# Patient Record
Sex: FEMALE | NOT HISPANIC OR LATINO | Employment: OTHER | URBAN - METROPOLITAN AREA
[De-identification: names, ages, dates, MRNs, and addresses within clinical notes are randomized per-mention and may not be internally consistent; named-entity substitution may affect disease eponyms.]

---

## 2017-08-21 ENCOUNTER — FOLLOW UP (OUTPATIENT)
Dept: URBAN - METROPOLITAN AREA CLINIC 33 | Facility: CLINIC | Age: 82
End: 2017-08-21

## 2017-08-21 VITALS — HEIGHT: 57 IN | WEIGHT: 105 LBS | BODY MASS INDEX: 22.65 KG/M2

## 2017-08-21 DIAGNOSIS — H43.391: ICD-10-CM

## 2017-08-21 DIAGNOSIS — H35.371: ICD-10-CM

## 2017-08-21 DIAGNOSIS — H43.812: ICD-10-CM

## 2017-08-21 DIAGNOSIS — H35.363: ICD-10-CM

## 2017-08-21 DIAGNOSIS — D31.31: ICD-10-CM

## 2017-08-21 DIAGNOSIS — H26.493: ICD-10-CM

## 2017-08-21 PROCEDURE — 92014 COMPRE OPH EXAM EST PT 1/>: CPT

## 2017-08-21 PROCEDURE — 92250 FUNDUS PHOTOGRAPHY W/I&R: CPT

## 2017-08-21 PROCEDURE — 76512 OPH US DX B-SCAN: CPT

## 2017-08-21 PROCEDURE — 92226 OPHTHALMOSCOPY (SUB): CPT

## 2017-08-21 ASSESSMENT — VISUAL ACUITY
OS_CC: 20/30-2
OD_CC: 20/30+2

## 2017-08-21 ASSESSMENT — TONOMETRY
OS_IOP_MMHG: 13
OD_IOP_MMHG: 14

## 2018-08-21 ENCOUNTER — FOLLOW UP (OUTPATIENT)
Dept: URBAN - METROPOLITAN AREA CLINIC 33 | Facility: CLINIC | Age: 83
End: 2018-08-21

## 2018-08-21 VITALS — HEIGHT: 60 IN

## 2018-08-21 DIAGNOSIS — H35.363: ICD-10-CM

## 2018-08-21 DIAGNOSIS — D31.31: ICD-10-CM

## 2018-08-21 DIAGNOSIS — H35.371: ICD-10-CM

## 2018-08-21 DIAGNOSIS — H43.812: ICD-10-CM

## 2018-08-21 DIAGNOSIS — H43.391: ICD-10-CM

## 2018-08-21 PROCEDURE — 92226 OPHTHALMOSCOPY (SUB): CPT

## 2018-08-21 PROCEDURE — 92014 COMPRE OPH EXAM EST PT 1/>: CPT

## 2018-08-21 PROCEDURE — 92250 FUNDUS PHOTOGRAPHY W/I&R: CPT

## 2018-08-21 PROCEDURE — 76512 OPH US DX B-SCAN: CPT

## 2018-08-21 ASSESSMENT — TONOMETRY
OD_IOP_MMHG: 12
OS_IOP_MMHG: 12

## 2018-08-21 ASSESSMENT — VISUAL ACUITY
OS_CC: 20/30
OD_CC: 20/30

## 2019-09-18 ENCOUNTER — FOLLOW UP (OUTPATIENT)
Dept: URBAN - METROPOLITAN AREA CLINIC 33 | Facility: CLINIC | Age: 84
End: 2019-09-18

## 2019-09-18 VITALS — HEIGHT: 66 IN | BODY MASS INDEX: 16.55 KG/M2 | WEIGHT: 103 LBS

## 2019-09-18 DIAGNOSIS — H35.371: ICD-10-CM

## 2019-09-18 DIAGNOSIS — H35.363: ICD-10-CM

## 2019-09-18 DIAGNOSIS — D31.31: ICD-10-CM

## 2019-09-18 DIAGNOSIS — H43.391: ICD-10-CM

## 2019-09-18 DIAGNOSIS — H43.812: ICD-10-CM

## 2019-09-18 PROCEDURE — 92226 OPHTHALMOSCOPY (SUB): CPT

## 2019-09-18 PROCEDURE — 76512 OPH US DX B-SCAN: CPT

## 2019-09-18 PROCEDURE — 92014 COMPRE OPH EXAM EST PT 1/>: CPT

## 2019-09-18 PROCEDURE — 92250 FUNDUS PHOTOGRAPHY W/I&R: CPT

## 2019-09-18 ASSESSMENT — TONOMETRY
OD_IOP_MMHG: 12
OS_IOP_MMHG: 13

## 2019-09-18 ASSESSMENT — VISUAL ACUITY
OD_CC: 20/30
OS_CC: 20/25+

## 2020-09-23 ENCOUNTER — FOLLOW UP (OUTPATIENT)
Dept: URBAN - METROPOLITAN AREA CLINIC 33 | Facility: CLINIC | Age: 85
End: 2020-09-23

## 2020-09-23 VITALS — WEIGHT: 106 LBS | HEIGHT: 66 IN | BODY MASS INDEX: 17.04 KG/M2

## 2020-09-23 DIAGNOSIS — H26.493: ICD-10-CM

## 2020-09-23 DIAGNOSIS — H35.363: ICD-10-CM

## 2020-09-23 DIAGNOSIS — D31.31: ICD-10-CM

## 2020-09-23 DIAGNOSIS — H43.391: ICD-10-CM

## 2020-09-23 DIAGNOSIS — H35.371: ICD-10-CM

## 2020-09-23 DIAGNOSIS — H43.812: ICD-10-CM

## 2020-09-23 PROCEDURE — 92250 FUNDUS PHOTOGRAPHY W/I&R: CPT

## 2020-09-23 PROCEDURE — 92014 COMPRE OPH EXAM EST PT 1/>: CPT

## 2020-09-23 PROCEDURE — 76512 OPH US DX B-SCAN: CPT

## 2020-09-23 ASSESSMENT — TONOMETRY
OS_IOP_MMHG: 15
OD_IOP_MMHG: 17

## 2020-09-23 ASSESSMENT — VISUAL ACUITY
OS_SC: 20/30-2
OD_SC: 20/30-2

## 2021-08-11 ENCOUNTER — FOLLOW UP (OUTPATIENT)
Dept: URBAN - METROPOLITAN AREA CLINIC 33 | Facility: CLINIC | Age: 86
End: 2021-08-11

## 2021-08-11 VITALS — WEIGHT: 96 LBS | HEIGHT: 57 IN | BODY MASS INDEX: 20.71 KG/M2

## 2021-08-11 DIAGNOSIS — H43.391: ICD-10-CM

## 2021-08-11 DIAGNOSIS — D31.31: ICD-10-CM

## 2021-08-11 DIAGNOSIS — H43.812: ICD-10-CM

## 2021-08-11 DIAGNOSIS — H35.363: ICD-10-CM

## 2021-08-11 DIAGNOSIS — H35.371: ICD-10-CM

## 2021-08-11 PROCEDURE — 92014 COMPRE OPH EXAM EST PT 1/>: CPT

## 2021-08-11 PROCEDURE — 76512 OPH US DX B-SCAN: CPT

## 2021-08-11 PROCEDURE — 92134 CPTRZ OPH DX IMG PST SGM RTA: CPT

## 2021-08-11 ASSESSMENT — VISUAL ACUITY
OS_SC: 20/25-2
OD_PH: 20/25
OD_SC: 20/40+2

## 2021-08-11 ASSESSMENT — TONOMETRY
OD_IOP_MMHG: 15
OS_IOP_MMHG: 10

## 2022-06-04 ENCOUNTER — TELEPHONE ENCOUNTER (OUTPATIENT)
Dept: URBAN - METROPOLITAN AREA CLINIC 68 | Facility: CLINIC | Age: 87
End: 2022-06-04

## 2022-06-05 ENCOUNTER — TELEPHONE ENCOUNTER (OUTPATIENT)
Dept: URBAN - METROPOLITAN AREA CLINIC 68 | Facility: CLINIC | Age: 87
End: 2022-06-05

## 2022-06-05 RX ORDER — LISINOPRIL 10 MG/1
LISINOPRIL( 10MG ORAL  DAILY ) ACTIVE -HX ENTRY TABLET ORAL DAILY
Status: ACTIVE | COMMUNITY
Start: 2013-07-10

## 2022-06-05 RX ORDER — LEVOTHYROXINE SODIUM 0.05 MG/1
LEVOTHYROXINE SODIUM( 50MCG ORAL  DAILY ) ACTIVE -HX ENTRY TABLET ORAL DAILY
Status: ACTIVE | COMMUNITY
Start: 2013-07-10

## 2022-06-25 ENCOUNTER — TELEPHONE ENCOUNTER (OUTPATIENT)
Age: 87
End: 2022-06-25

## 2022-06-25 RX ORDER — SODIUM SULFATE, POTASSIUM SULFATE, MAGNESIUM SULFATE 17.5; 3.13; 1.6 G/ML; G/ML; G/ML
SOLUTION, CONCENTRATE ORAL AS DIRECTED
Qty: 1 | Refills: 0 | OUTPATIENT
Start: 2013-07-10 | End: 2013-07-11

## 2022-06-26 ENCOUNTER — TELEPHONE ENCOUNTER (OUTPATIENT)
Age: 87
End: 2022-06-26

## 2022-06-26 RX ORDER — LEVOTHYROXINE SODIUM 50 UG/1
LEVOTHYROXINE SODIUM( 50MCG ORAL  DAILY ) ACTIVE -HX ENTRY TABLET ORAL DAILY
Status: ACTIVE | COMMUNITY
Start: 2013-07-10

## 2022-06-26 RX ORDER — LISINOPRIL 10 MG/1
LISINOPRIL( 10MG ORAL  DAILY ) ACTIVE -HX ENTRY TABLET ORAL DAILY
Status: ACTIVE | COMMUNITY
Start: 2013-07-10

## 2025-07-07 ENCOUNTER — APPOINTMENT (OUTPATIENT)
Dept: URBAN - METROPOLITAN AREA CLINIC 124 | Facility: CLINIC | Age: OVER 89
Setting detail: DERMATOLOGY
End: 2025-07-07

## 2025-07-07 DIAGNOSIS — L82.1 OTHER SEBORRHEIC KERATOSIS: ICD-10-CM

## 2025-07-07 DIAGNOSIS — L21.8 OTHER SEBORRHEIC DERMATITIS: ICD-10-CM | Status: INADEQUATELY CONTROLLED

## 2025-07-07 PROCEDURE — ? PRESCRIPTION

## 2025-07-07 PROCEDURE — ? PRESCRIPTION MEDICATION MANAGEMENT

## 2025-07-07 PROCEDURE — ? COUNSELING

## 2025-07-07 PROCEDURE — ?

## 2025-07-07 RX ORDER — KETOCONAZOLE 20 MG/ML
SHAMPOO, SUSPENSION TOPICAL QOD
Qty: 120 | Refills: 4 | Status: ERX | COMMUNITY
Start: 2025-07-07

## 2025-07-07 RX ADMIN — KETOCONAZOLE: 20 SHAMPOO, SUSPENSION TOPICAL at 00:00

## 2025-07-07 ASSESSMENT — LOCATION ZONE DERM
LOCATION ZONE: TRUNK
LOCATION ZONE: FACE
LOCATION ZONE: SCALP

## 2025-07-07 ASSESSMENT — LOCATION DETAILED DESCRIPTION DERM
LOCATION DETAILED: LEFT SUPERIOR PARIETAL SCALP
LOCATION DETAILED: LEFT CLAVICULAR SKIN
LOCATION DETAILED: RIGHT SUPERIOR PARIETAL SCALP
LOCATION DETAILED: RIGHT CLAVICULAR SKIN
LOCATION DETAILED: LEFT CENTRAL MALAR CHEEK

## 2025-07-07 ASSESSMENT — LOCATION SIMPLE DESCRIPTION DERM
LOCATION SIMPLE: LEFT CLAVICULAR SKIN
LOCATION SIMPLE: SCALP
LOCATION SIMPLE: RIGHT CLAVICULAR SKIN
LOCATION SIMPLE: LEFT CHEEK

## 2025-07-07 NOTE — PROCEDURE: PRESCRIPTION MEDICATION MANAGEMENT
Detail Level: Zone
Initiate Treatment: ketoconazole 2 % shampoo QOD\\nLather on to scalp, leave for 5 minutes, then rinse off. Use 2-3 times a week, as needed.
Render In Strict Bullet Format?: No